# Patient Record
Sex: FEMALE | HISPANIC OR LATINO
[De-identification: names, ages, dates, MRNs, and addresses within clinical notes are randomized per-mention and may not be internally consistent; named-entity substitution may affect disease eponyms.]

---
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
## 2021-01-26 ENCOUNTER — EMERGENCY (EMERGENCY)
Facility: HOSPITAL | Age: 21
LOS: 0 days | Discharge: HOME | End: 2021-01-27
Attending: EMERGENCY MEDICINE
Payer: COMMERCIAL

## 2021-01-26 VITALS
SYSTOLIC BLOOD PRESSURE: 114 MMHG | OXYGEN SATURATION: 98 % | TEMPERATURE: 98 F | HEIGHT: 61 IN | RESPIRATION RATE: 18 BRPM | WEIGHT: 121.92 LBS | DIASTOLIC BLOOD PRESSURE: 65 MMHG | HEART RATE: 89 BPM

## 2021-01-26 DIAGNOSIS — G89.29 OTHER CHRONIC PAIN: ICD-10-CM

## 2021-01-26 DIAGNOSIS — M54.5 LOW BACK PAIN: ICD-10-CM

## 2021-01-26 LAB
ALBUMIN SERPL ELPH-MCNC: 3.7 G/DL — SIGNIFICANT CHANGE UP (ref 3.3–5)
ALP SERPL-CCNC: 65 U/L — SIGNIFICANT CHANGE UP (ref 40–120)
ALT FLD-CCNC: 18 U/L — SIGNIFICANT CHANGE UP (ref 12–78)
ANION GAP SERPL CALC-SCNC: 9 MMOL/L — SIGNIFICANT CHANGE UP (ref 5–17)
AST SERPL-CCNC: 8 U/L — LOW (ref 15–37)
BASOPHILS # BLD AUTO: 0.05 K/UL — SIGNIFICANT CHANGE UP (ref 0–0.2)
BASOPHILS NFR BLD AUTO: 0.6 % — SIGNIFICANT CHANGE UP (ref 0–2)
BILIRUB SERPL-MCNC: 0.3 MG/DL — SIGNIFICANT CHANGE UP (ref 0.2–1.2)
BUN SERPL-MCNC: 11 MG/DL — SIGNIFICANT CHANGE UP (ref 7–23)
CALCIUM SERPL-MCNC: 9.1 MG/DL — SIGNIFICANT CHANGE UP (ref 8.5–10.1)
CHLORIDE SERPL-SCNC: 110 MMOL/L — HIGH (ref 96–108)
CO2 SERPL-SCNC: 22 MMOL/L — SIGNIFICANT CHANGE UP (ref 22–31)
CREAT SERPL-MCNC: 0.48 MG/DL — LOW (ref 0.5–1.3)
EOSINOPHIL # BLD AUTO: 0.32 K/UL — SIGNIFICANT CHANGE UP (ref 0–0.5)
EOSINOPHIL NFR BLD AUTO: 4 % — SIGNIFICANT CHANGE UP (ref 0–6)
GLUCOSE SERPL-MCNC: 92 MG/DL — SIGNIFICANT CHANGE UP (ref 70–99)
HCG SERPL-ACNC: <1 MIU/ML — SIGNIFICANT CHANGE UP
HCT VFR BLD CALC: 37.6 % — SIGNIFICANT CHANGE UP (ref 34.5–45)
HGB BLD-MCNC: 13.1 G/DL — SIGNIFICANT CHANGE UP (ref 11.5–15.5)
IMM GRANULOCYTES NFR BLD AUTO: 0.3 % — SIGNIFICANT CHANGE UP (ref 0–1.5)
LYMPHOCYTES # BLD AUTO: 2.86 K/UL — SIGNIFICANT CHANGE UP (ref 1–3.3)
LYMPHOCYTES # BLD AUTO: 35.8 % — SIGNIFICANT CHANGE UP (ref 13–44)
MCHC RBC-ENTMCNC: 31.7 PG — SIGNIFICANT CHANGE UP (ref 27–34)
MCHC RBC-ENTMCNC: 34.8 GM/DL — SIGNIFICANT CHANGE UP (ref 32–36)
MCV RBC AUTO: 91 FL — SIGNIFICANT CHANGE UP (ref 80–100)
MONOCYTES # BLD AUTO: 0.55 K/UL — SIGNIFICANT CHANGE UP (ref 0–0.9)
MONOCYTES NFR BLD AUTO: 6.9 % — SIGNIFICANT CHANGE UP (ref 2–14)
NEUTROPHILS # BLD AUTO: 4.2 K/UL — SIGNIFICANT CHANGE UP (ref 1.8–7.4)
NEUTROPHILS NFR BLD AUTO: 52.4 % — SIGNIFICANT CHANGE UP (ref 43–77)
PLATELET # BLD AUTO: 322 K/UL — SIGNIFICANT CHANGE UP (ref 150–400)
POTASSIUM SERPL-MCNC: 3.5 MMOL/L — SIGNIFICANT CHANGE UP (ref 3.5–5.3)
POTASSIUM SERPL-SCNC: 3.5 MMOL/L — SIGNIFICANT CHANGE UP (ref 3.5–5.3)
PROT SERPL-MCNC: 7 GM/DL — SIGNIFICANT CHANGE UP (ref 6–8.3)
RBC # BLD: 4.13 M/UL — SIGNIFICANT CHANGE UP (ref 3.8–5.2)
RBC # FLD: 12.3 % — SIGNIFICANT CHANGE UP (ref 10.3–14.5)
SODIUM SERPL-SCNC: 141 MMOL/L — SIGNIFICANT CHANGE UP (ref 135–145)
WBC # BLD: 8 K/UL — SIGNIFICANT CHANGE UP (ref 3.8–10.5)
WBC # FLD AUTO: 8 K/UL — SIGNIFICANT CHANGE UP (ref 3.8–10.5)

## 2021-01-26 PROCEDURE — 99284 EMERGENCY DEPT VISIT MOD MDM: CPT

## 2021-01-26 PROCEDURE — 84702 CHORIONIC GONADOTROPIN TEST: CPT

## 2021-01-26 PROCEDURE — 80053 COMPREHEN METABOLIC PANEL: CPT

## 2021-01-26 PROCEDURE — 99284 EMERGENCY DEPT VISIT MOD MDM: CPT | Mod: 25

## 2021-01-26 PROCEDURE — 72132 CT LUMBAR SPINE W/DYE: CPT | Mod: 26

## 2021-01-26 PROCEDURE — 99053 MED SERV 10PM-8AM 24 HR FAC: CPT

## 2021-01-26 PROCEDURE — 85025 COMPLETE CBC W/AUTO DIFF WBC: CPT

## 2021-01-26 PROCEDURE — 96361 HYDRATE IV INFUSION ADD-ON: CPT

## 2021-01-26 PROCEDURE — 72132 CT LUMBAR SPINE W/DYE: CPT

## 2021-01-26 PROCEDURE — 36415 COLL VENOUS BLD VENIPUNCTURE: CPT

## 2021-01-26 PROCEDURE — 96374 THER/PROPH/DIAG INJ IV PUSH: CPT | Mod: XU

## 2021-01-26 RX ORDER — LIDOCAINE 4 G/100G
1 CREAM TOPICAL ONCE
Refills: 0 | Status: COMPLETED | OUTPATIENT
Start: 2021-01-26 | End: 2021-01-26

## 2021-01-26 RX ORDER — SODIUM CHLORIDE 9 MG/ML
1000 INJECTION INTRAMUSCULAR; INTRAVENOUS; SUBCUTANEOUS ONCE
Refills: 0 | Status: COMPLETED | OUTPATIENT
Start: 2021-01-26 | End: 2021-01-26

## 2021-01-26 RX ORDER — KETOROLAC TROMETHAMINE 30 MG/ML
30 SYRINGE (ML) INJECTION ONCE
Refills: 0 | Status: DISCONTINUED | OUTPATIENT
Start: 2021-01-26 | End: 2021-01-26

## 2021-01-26 RX ADMIN — SODIUM CHLORIDE 1000 MILLILITER(S): 9 INJECTION INTRAMUSCULAR; INTRAVENOUS; SUBCUTANEOUS at 23:31

## 2021-01-26 RX ADMIN — LIDOCAINE 1 PATCH: 4 CREAM TOPICAL at 23:30

## 2021-01-26 RX ADMIN — Medication 30 MILLIGRAM(S): at 23:31

## 2021-01-26 NOTE — ED PROVIDER NOTE - CLINICAL SUMMARY MEDICAL DECISION MAKING FREE TEXT BOX
pt with chronic lower back pain worse after epidural.  will check CT, labs, IVF, meds and reeval r/o abscess

## 2021-01-26 NOTE — ED PROVIDER NOTE - NS ED MD EM SELECTION
Patient is a 44/F with PMHx significant for Sickle cell disease (last known crisis 2013), cerebral aneurysm, migraine d/o who presented to the ED with sudden onset of back and rib pain at approx 8pm on the day of admission, found to be in sickle cell crisis, admit to F for further management, pain relief. Started on IVF.  Pain treated with Percocet, Dilaudid PRN. Cardiomegaly noted on chest x-ray, TTE performed. Hx of AVN of the     left hip, continued on home medication Neurotin. Heme, Dr. Gibbs, consulted FROM ADMISSION H+P:     HPI:    Patient is a 44/F with PMHx significant for Sickle cell disease (last known crisis September 2019), cerebral aneurysm, migraine, and avascular necrosis who presented to the ED with sudden onset of back and rib pain at approximately 7pm on the day of admission. Patient had attempted to take percocet for pain relief at 8:30 without any improvement in symptoms. Pt has percocet prescribed for avascular necrosis and she normally takes is BID. Pt states this is how her sickle cell crisis always presents. States her current pain is 10/10. Admits to sickle cell crisis about twice a year that requires hospitalization. States her baseline hemoglobin is around 8 and she needs a transfusion about once a year for hemoglobin <7. She denies any associated chest pain, SOB, n/v/d, abdominal pain, changes in vision, weakness, sick contacts, recent travel. Admits to a little bit of a headache.        ED Vitals:     T 98.5 HR 89 /93 RR 18 SpO2 97% on RA    Labs: Leukocytosis WBC 16.33 with lymphocytic predominance, anemic Hb 7.5 (Baseline 8), reticulocyte % 15.0. CMP wnl. COVID 19 PCR pending    CXR: lungs clear, lateral haziness likely overlying subq fat, enlarged heart despite AP film, when compared to previous AP film 2013. ([facility] read)    EKG: prelim NSR with borderline LVH critera    In the ED Patient received 1L Ns bolus, dilaudid 0.5mg x 1, morphine 4mg x1, zofran 4mg x 1 (11 May 2020 00:40)            ---    HOSPITAL COURSE:         Patient is a 44/F with PMHx significant for Sickle cell disease (last known crisis 2013), cerebral aneurysm, migraine d/o who presented to the ED with sudden onset of back and rib pain at approx 8pm on the day of admission, found to be in sickle cell crisis, admit to F for further management, pain relief. Started on IVF.  Pain treated with Percocet, Dilaudid PRN. Cardiomegaly noted on chest x-ray, TTE performed to evaluate cardiomegaly further, which showed normal LV size and function, normal RV size and function, trace physiologic MR/TR. Additionally, she has a history of avascular necrosis of the  left hip, continued on home medication Neurotin. Heme, Dr. Currie, evaluated the patient, suggested trending reticulocyte count and LDH, and Hb electrophoresis performed to document sickle cell disease. Patient's pain was controlled, and she improved clinically throughout her hospital course, and was stable for discharge home without any home care needs.         ---    CONSULTANTS:     Dr Currie (Heme/Onc)    ---        Patient was seen and evaluated on the day of discharge, stable for discharge home        Vital Signs Last 24 Hrs    T(C): 37.1 (14 May 2020 05:27), Max: 37.9 (13 May 2020 20:51)    T(F): 98.7 (14 May 2020 05:27), Max: 100.3 (13 May 2020 20:51)    HR: 75 (14 May 2020 05:27) (75 - 87)    BP: 119/78 (14 May 2020 05:27) (109/71 - 134/74)    BP(mean): --    RR: 17 (14 May 2020 05:27) (17 - 17)    SpO2: 93% (14 May 2020 05:27) (93% - 97%)        PHYSICAL EXAM:    GENERAL: NAD, well-groomed, well-developed    HEAD:  Atraumatic, Normocephalic    EYES: EOMI, PERRLA, conjunctiva and sclera clear    ENMT: No tonsillar erythema, exudates, or enlargement;     Moist mucous membranes,  No lesions    NECK: Supple, No JVD,    NERVOUS SYSTEM:  Alert & Oriented X3, Good concentration;     Motor Strength 5/5 B/L upper and lower extremities; DTRs 2+ intact and symmetric    CHEST/LUNG:  percussion bilaterally; No rales, rhonchi, wheezing,     HEART: Regular rate and rhythm; No murmurs, no tachy     ABDOMEN: Soft, Nontender, Nondistended; Bowel sounds present    EXTREMITIES:  2+ Peripheral Pulses, No clubbing, cyanosis, or edema    SKIN: No rashes or lesions            TIME SPENT:    I, the attending physician, was physically present for the key portions of the evaluation and management (E/M) service provided. The total amount of time spent reviewing the hospital notes, laboratory values, imaging findings, assessing/counseling the patient, discussing with consultant physicians, social work, nursing staff was -- minutes FROM ADMISSION H+P:     HPI:    Patient is a 44/F with PMHx significant for Sickle cell disease (last known crisis September 2019), cerebral aneurysm, migraine, and avascular necrosis who presented to the ED with sudden onset of back and rib pain at approximately 7pm on the day of admission. Patient had attempted to take percocet for pain relief at 8:30 without any improvement in symptoms. Pt has percocet prescribed for avascular necrosis and she normally takes is BID. Pt states this is how her sickle cell crisis always presents. States her current pain is 10/10. Admits to sickle cell crisis about twice a year that requires hospitalization. States her baseline hemoglobin is around 8 and she needs a transfusion about once a year for hemoglobin <7. She denies any associated chest pain, SOB, n/v/d, abdominal pain, changes in vision, weakness, sick contacts, recent travel. Admits to a little bit of a headache.        ED Vitals:     T 98.5 HR 89 /93 RR 18 SpO2 97% on RA    Labs: Leukocytosis WBC 16.33 with lymphocytic predominance, anemic Hb 7.5 (Baseline 8), reticulocyte % 15.0. CMP wnl. COVID 19 PCR pending    CXR: lungs clear, lateral haziness likely overlying subq fat, enlarged heart despite AP film, when compared to previous AP film 2013. ([facility] read)    EKG: prelim NSR with borderline LVH critera    In the ED Patient received 1L Ns bolus, dilaudid 0.5mg x 1, morphine 4mg x1, zofran 4mg x 1 (11 May 2020 00:40)            ---    HOSPITAL COURSE:         Patient is a 44/F with PMHx significant for Sickle cell disease (last known crisis 2013), cerebral aneurysm, migraine d/o who presented to the ED with sudden onset of back and rib pain at approx 8pm on the day of admission, found to be in sickle cell crisis, admit to F for further management, pain relief. Started on IVF.  Pain treated with Percocet, Dilaudid PRN. Cardiomegaly noted on chest x-ray, TTE performed to evaluate cardiomegaly further, which showed normal LV size and function, normal RV size and function, trace physiologic MR/TR. Additionally, she has a history of avascular necrosis of the  left hip, continued on home medication Neurotin. Heme, Dr. Currie, evaluated the patient, suggested trending reticulocyte count and LDH, and Hb electrophoresis performed to document sickle cell disease. Patient's pain was controlled   recived iv dialudid , and she improved clinically throughout her hospital course    hb remain above 7 so no need  blood transfusion , and was stable for discharge home without any home care needs.     pt pmd dr dykes out pt notified dc plan will fu up closely out pt , pt will fu up with her own hem/onc and pain management out pt . medically stable day of dc     Vital Signs Last 24 Hrs    T(C): 36.7 (15 May 2020 05:56), Max: 37.4 (14 May 2020 13:41)    T(F): 98 (15 May 2020 05:56), Max: 99.4 (14 May 2020 21:48)    HR: 74 (15 May 2020 05:56) (74 - 87)    BP: 101/67 (15 May 2020 05:56) (101/64 - 114/76)    BP(mean): --    RR: 18 (15 May 2020 05:56) (17 - 18)    SpO2: 95% (15 May 2020 05:56) (95% - 97%)    CONSULTANTS:     Dr Currie (Heme/Onc)    ---        Patient was seen and evaluated on the day of discharge,     stable for discharge home        Vital Signs Last 24 Hrs    T(C): 37.1 (14 May 2020 05:27), Max: 37.9 (13 May 2020 20:51)    T(F): 98.7 (14 May 2020 05:27), Max: 100.3 (13 May 2020 20:51)    HR: 75 (14 May 2020 05:27) (75 - 87)    BP: 119/78 (14 May 2020 05:27) (109/71 - 134/74)    BP(mean): --    RR: 17 (14 May 2020 05:27) (17 - 17)    SpO2: 93% (14 May 2020 05:27) (93% - 97%)        PHYSICAL EXAM:    GENERAL: NAD, well-groomed, well-developed    HEAD:  Atraumatic, Normocephalic    EYES: EOMI, PERRLA, conjunctiva and sclera clear    ENMT: No tonsillar erythema, exudates, or enlargement;     Moist mucous membranes,  No lesions    NECK: Supple, No JVD,    NERVOUS SYSTEM:  Alert & Oriented X3, Good concentration;     Motor Strength 5/5 B/L upper and lower extremities; DTRs 2+ intact and symmetric    CHEST/LUNG:  percussion bilaterally; No rales, rhonchi, wheezing,     HEART: Regular rate and rhythm; No murmurs, no tachy     ABDOMEN: Soft, Nontender, Nondistended; Bowel sounds present    EXTREMITIES:  2+ Peripheral Pulses, No clubbing, cyanosis, or edema    SKIN: No rashes or lesions            TIME SPENT:    I, the attending physician, was physically present for the key portions of the evaluation and management (E/M) service provided. The total amount of time spent reviewing the hospital notes, laboratory values, imaging findings, assessing/counseling the patient, discussing with consultant physicians, social work, nursing staff was 55 minutes [id number] Comprehensive
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
## 2021-01-26 NOTE — ED PROVIDER NOTE - OBJECTIVE STATEMENT
19 y/o female in ED c/o persistent worsening lower back pain x 1 month.   pt states recent epidural that has made pain worse.   pt states taking motrin with minimal relief.   pt denies any fever, HA, cp, sob, n/v/d/abd pain.   states increase pain with ambulation.   tolerating PO.   no sick contacts or recent travel
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
## 2021-01-26 NOTE — ED PROVIDER NOTE - PATIENT PORTAL LINK FT
You can access the FollowMyHealth Patient Portal offered by [facility] by registering at the following website: [email or web address]. By joining "[employer]’s FollowMyHealth portal, you will also be able to view your health information using other applications (apps) compatible with our system.

## 2021-01-26 NOTE — ED ADULT NURSE NOTE - NSIMPLEMENTINTERV_GEN_ALL_ED
Implemented All Fall Risk Interventions:  [place] to call system. Call bell, personal items and telephone within reach. Instruct patient to call for assistance. Room bathroom lighting operational. Non-slip footwear when patient is off stretcher. Physically safe environment: no spills, clutter or unnecessary equipment. Stretcher in lowest position, wheels locked, appropriate side rails in place. Provide visual cue, wrist band, yellow gown, etc. Monitor gait and stability. Monitor for mental status changes and reorient to person, place, and time. Review medications for side effects contributing to fall risk. Reinforce activity limits and safety measures with patient and family.

## 2021-01-26 NOTE — ED PROVIDER NOTE - CARE PLAN
Principal Discharge DX:	Chronic low back pain, unspecified back pain laterality, unspecified whether sciatica present

## 2021-01-26 NOTE — ED PROVIDER NOTE - NSFOLLOWUPINSTRUCTIONS_ED_ALL_ED_FT
please follow up with your doctor in 2-3 days.   take medications as prescribed.   drink plenty of fluids.   may use ice or heating pads for comfort.   return to ED for any concerns.   continue with PT as directed.      consulte con peoples médico en 2-3 días. tome los medicamentos según lo prescrito. beber mucho líquido. puede usar hielo o almohadillas térmicas para peoples comodidad. regrese al servicio de urgencias por cualquier inquietud. continúe con PT bob se le indique.

## 2021-01-27 VITALS
OXYGEN SATURATION: 99 % | HEART RATE: 68 BPM | TEMPERATURE: 98 F | RESPIRATION RATE: 18 BRPM | SYSTOLIC BLOOD PRESSURE: 118 MMHG | DIASTOLIC BLOOD PRESSURE: 72 MMHG

## 2021-01-27 PROBLEM — Z78.9 OTHER SPECIFIED HEALTH STATUS: Chronic | Status: ACTIVE | Noted: 2020-12-02

## 2021-01-27 RX ORDER — ACETAMINOPHEN 500 MG
650 TABLET ORAL ONCE
Refills: 0 | Status: COMPLETED | OUTPATIENT
Start: 2021-01-27 | End: 2021-01-27

## 2021-01-27 RX ORDER — CYCLOBENZAPRINE HYDROCHLORIDE 10 MG/1
1 TABLET, FILM COATED ORAL
Qty: 15 | Refills: 0
Start: 2021-01-27 | End: 2021-01-31

## 2021-01-27 RX ORDER — IBUPROFEN 200 MG
1 TABLET ORAL
Qty: 40 | Refills: 0
Start: 2021-01-27 | End: 2021-02-05

## 2021-01-27 RX ORDER — ACETAMINOPHEN 500 MG
2 TABLET ORAL
Qty: 120 | Refills: 0
Start: 2021-01-27 | End: 2021-02-05

## 2021-01-27 RX ADMIN — SODIUM CHLORIDE 1000 MILLILITER(S): 9 INJECTION INTRAMUSCULAR; INTRAVENOUS; SUBCUTANEOUS at 00:31

## 2021-01-27 RX ADMIN — Medication 650 MILLIGRAM(S): at 00:13

## 2021-01-27 NOTE — ED ADULT NURSE REASSESSMENT NOTE - NS ED NURSE REASSESS COMMENT FT1
patient able to ambulate independently with a steady gait while maintaining safety. patient states that she has pain when she walks but is able to ambulate safely independently.

## 2022-11-04 ENCOUNTER — EMERGENCY (EMERGENCY)
Facility: HOSPITAL | Age: 22
LOS: 0 days | Discharge: HOME | End: 2022-11-04
Attending: EMERGENCY MEDICINE
Payer: COMMERCIAL

## 2022-11-04 VITALS
HEART RATE: 81 BPM | RESPIRATION RATE: 19 BRPM | OXYGEN SATURATION: 100 % | TEMPERATURE: 98 F | SYSTOLIC BLOOD PRESSURE: 112 MMHG | DIASTOLIC BLOOD PRESSURE: 68 MMHG

## 2022-11-04 VITALS — WEIGHT: 126.99 LBS | HEIGHT: 61 IN

## 2022-11-04 DIAGNOSIS — R09.81 NASAL CONGESTION: ICD-10-CM

## 2022-11-04 DIAGNOSIS — R60.0 LOCALIZED EDEMA: ICD-10-CM

## 2022-11-04 PROCEDURE — 99283 EMERGENCY DEPT VISIT LOW MDM: CPT

## 2022-11-04 PROCEDURE — 99282 EMERGENCY DEPT VISIT SF MDM: CPT

## 2022-11-04 NOTE — ED STATDOCS - ATTENDING APP SHARED VISIT CONTRIBUTION OF CARE
I, Joe Urena, performed the initial face to face bedside interview with this patient regarding history of present illness, review of symptoms and relevant past medical, social and family history.  I completed an independent physical examination.  I was the initial provider who evaluated this patient. I have signed out the follow up of any pending tests (i.e. labs, radiological studies) to the JENNIFER.  I have communicated the patient’s plan of care and disposition with the JENNIFER.  The history, relevant review of systems, past medical and surgical history, medical decision making, and physical examination was documented by the scribe in my presence and I attest to the accuracy of the documentation.     pt with nasal congestion chronic.   visible mild edema of nasal turbinates.   lungs CTA.   will d/c with scripts and have f/u

## 2022-11-04 NOTE — ED ADULT TRIAGE NOTE - CHIEF COMPLAINT QUOTE
patient presenting ambulatory to ED c/o nasal congestion x2 months. patient states she was taking afrin since start of symptoms and stopped a few days ago, c/o worsening nasal congestion.

## 2022-11-04 NOTE — ED STATDOCS - NSFOLLOWUPINSTRUCTIONS_ED_ALL_ED_FT
Follow up with your primary care doctor and ENT doctor for further evaluation.    Return to the ER for any new or other concerns.        Nonallergic Rhinitis      Nonallergic rhinitis is inflammation of the mucous membrane inside the nose. The mucous membrane is the tissue that produces mucus. This condition is different from having allergic rhinitis, which is an allergy that affects the nose. Allergic rhinitis occurs when the body's defense system, or immune system, reacts to a substance that a person is allergic to (allergen), such as pollen, pet dander, mold, or dust. Nonallergic rhinitis has many similar symptoms, but it is not caused by allergens.    Nonallergic rhinitis can be an acute or chronic problem. This means it can be short-term or long-term.      What are the causes?    This condition may be caused by many different things. Some common types of nonallergic rhinitis include:  •Infectious rhinitis. This is usually caused by an infection in the nose, throat, or upper airways (upper respiratory system).      •Vasomotor rhinitis. This is the most common type of chronic nonallergic rhinitis. It is caused by too much blood flow through your nose, and it leads to swelling in your nose. It is triggered by strong odors, cold air, stress, drinking alcohol, cigarette smoke, or changes in the weather.      •Occupational rhinitis. This type is caused by triggers in the workplace, such as chemicals, dust, animal dander, or air pollution.      •Hormonal rhinitis, in teenage girls and women. This type is caused by an increase in the hormone estrogen and may happen during pregnancy, puberty, or monthly menstrual periods. Hormonal rhinitis gives you fewer symptoms when estrogen levels drop.      •Drug-induced rhinitis. Several types of medicines can cause this, such as medicines for high blood pressure or heart disease, aspirin, or NSAIDs.      •Nonallergic rhinitis with eosinophilia syndrome (NARES). This type is caused by having too much eosinophil, a type of white blood cell.      Other causes include a reaction to eating hot or spicy foods. This does not usually cause long-term symptoms.    In some cases, the cause of nonallergic rhinitis is not known.      What increases the risk?    You are more likely to develop this condition if:  •You are 30–60 years of age.      •You are a woman. Women are twice as likely to have this condition.        What are the signs or symptoms?    Common symptoms of this condition include:  •Stuffy nose (nasal congestion).      •Runny nose.      •A feeling of mucus dripping down the back of your throat (postnasal drip).      •Trouble sleeping.      •Tiredness, or fatigue.      Other symptoms include:  •Sneezing.      •Coughing.      •Itchy nose.      •Bloodshot eyes.        How is this diagnosed?    This type may be diagnosed based on:  •Your symptoms and medical history.      •A physical exam.      •Allergy testing to rule out allergic rhinitis. You may have skin tests or blood tests.      Your health care provider may also take a swab of nasal discharge to look for an increased number of eosinophils. This would be done to confirm a diagnosis of NARES.      How is this treated?     Treatment for this condition depends on the cause. No single treatment works for everyone. Work with your health care provider to find the best treatment for you. Treatment may include:  •Avoiding the things that trigger your symptoms.    •Medicines to relieve congestion, such as:  •Steroid nasal spray. There are many types. You may need to try a few to find out which one works best.      •Decongestant medicine. This treats nasal congestion and may be given by mouth or as a nasal spray. These medicines are used only for a short time.        •Medicines to relieve a runny nose. These may include antihistamine medicines or anticholinergic nasal sprays.      •Nasal irrigation. This involves using a salt–water (saline) spray or saline container called a neti pot. Nasal irrigation helps to clear away mucus and keep your nasal passages moist.      •Surgery to remove part of your mucous membrane. This is done in severe cases if the condition has not improved after 6–12 months of treatment.        Follow these instructions at home:    Medicines     •Take or use over-the-counter and prescription medicines only as told by your health care provider. Do not stop using your medicine even if you start to feel better.      • Do not take NSAIDs, such as ibuprofen, or medicines that contain aspirin if they make your symptoms worse.      Lifestyle     • Do not drink alcohol if it makes your symptoms worse.      • Do not use any products that contain nicotine or tobacco, such as cigarettes, e-cigarettes, and chewing tobacco. If you need help quitting, ask your health care provider.      •Avoid secondhand smoke.      General instructions     •Avoid triggers that make your symptoms worse.      •Use nasal irrigation as told by your health care provider.      •Get exercise. Exercise may help reduce symptoms for some people.      •Sleep with the head of your bed raised. This may reduce nasal congestion when you sleep.      •Drink enough fluid to keep your urine pale yellow.      •Keep all follow-up visits as told by your health care provider. This is important.        Contact a health care provider if:    •You have a fever.      •Your symptoms are getting worse at home.      •Your symptoms do not lessen with medicine.      •You develop new symptoms, especially a headache or nosebleed.        Summary    •Nonallergic rhinitis is inflammation inside the nose that is not caused by allergens. Nonallergic rhinitis can be a short-term or long-term problem.      •Treatment may include avoiding the things that trigger your symptoms.      •Take or use over-the-counter and prescription medicines only as told by your health care provider. Do not stop using your medicine even if you start to feel better.      •Contact a health care provider if your symptoms do not lessen with medicine.

## 2022-11-04 NOTE — ED STATDOCS - PROGRESS NOTE DETAILS
22 yo female presents with nasal congestion x 3 months. Pt states she saw her PMD and was placed on afrin, flonase, and loratadine which has been feeling better at first and getting worse. Pt was supposed to f/u with her pmd on 10/25 but had her appointment canceled and had it placed towards the end of the month. Advised pt to stop the afrin, and continue the flonase and loratadine. Will give NET referral to have further evaluation of her symptoms. Pt aware and agrees with plan. -Julio Pacheco PA-C

## 2022-11-04 NOTE — ED STATDOCS - PATIENT PORTAL LINK FT
You can access the FollowMyHealth Patient Portal offered by [facility] by registering at the following website: [email or web address]. By joining [employer]’s FollowMyHealth portal, you will also be able to view your health information using other applications (apps) compatible with our system.

## 2022-11-04 NOTE — ED STATDOCS - ENMT, MLM
+ mild edematous nasal ter bident. Nasal mucosa clear.  Mouth with normal mucosa  Throat has no vesicles, no oropharyngeal exudates and uvula is midline. + mild edematous nasal turbinates. Nasal mucosa clear.  Mouth with normal mucosa  Throat has no vesicles, no oropharyngeal exudates and uvula is midline.

## 2022-11-04 NOTE — ED STATDOCS - OBJECTIVE STATEMENT
22 y/o female no PMHx presents to ED with nasal congestion for 3 months. Pt has been taking Asthma spray and multiple other sprays with no relief. Has not followed up with ENT. Pt denies any other symptoms. 22 y/o female no PMHx presents to ED with nasal congestion for 3 months. Pt has been taking Afrin spray and multiple other sprays with no relief. Has not followed up with ENT. Pt denies any other symptoms.

## 2023-05-02 NOTE — ED STATDOCS - CROS ED GI ALL NEG
Quality 130: Documentation Of Current Medications In The Medical Record: Current Medications Documented Quality 431: Preventive Care And Screening: Unhealthy Alcohol Use - Screening: Patient not identified as an unhealthy alcohol user when screened for unhealthy alcohol use using a systematic screening method Detail Level: Detailed Quality 226: Preventive Care And Screening: Tobacco Use: Screening And Cessation Intervention: Patient screened for tobacco use and is an ex/non-smoker negative...

## 2023-08-08 ENCOUNTER — NON-APPOINTMENT (OUTPATIENT)
Age: 23
End: 2023-08-08

## 2023-08-08 ENCOUNTER — APPOINTMENT (OUTPATIENT)
Dept: DERMATOLOGY | Facility: CLINIC | Age: 23
End: 2023-08-08
Payer: COMMERCIAL

## 2023-08-08 DIAGNOSIS — L82.1 OTHER SEBORRHEIC KERATOSIS: ICD-10-CM

## 2023-08-08 PROCEDURE — 17111 DESTRUCTION B9 LESIONS 15/>: CPT

## 2023-08-08 PROCEDURE — 99202 OFFICE O/P NEW SF 15 MIN: CPT | Mod: 25

## 2023-08-08 NOTE — ASSESSMENT
[FreeTextEntry1] : #Verruca vulgaris x27 Mostly around R knee but also BLE - The patient was informed of the pathophysiology of their lesions and their treatment course with liquid nitrogen (cryosurgery). Side effects include blister formation, hypopigmentation, and scarring. Patient was verbally consented and the lesions identified above were treated with liquid nitrogen freeze, thaw, freeze x 10 seconds each cycle x 2. The patient tolerated the procedure well.  Wound care instructions, care of a blister with vaseline, signs and symptoms of infection were discussed in full.  The patient denies any questions at this time. - Candida antigen 0.1cc injected SQ into R knee. Patient tolerated it well. Side effects discussed  #Dermatosis papulosa nigra, face and L neck - Benign diagnosis - Patient reassured - Nothing further to do; OTC sarna PRN itch  RTC 6weeks

## 2023-08-08 NOTE — HISTORY OF PRESENT ILLNESS
[FreeTextEntry1] : warts [de-identified] : Referred by: Dr. CHARISSE DOMINGUEZ,ANTHONY COLUNGA  Ms. FELISA SHARPE is a 22 year old F here for evaluation of below  Problem: warts Location: started on R knee, spreading on BLE Duration: years Associated symptoms/Aggravating Factors: spreading Modifying factors/Treatments: none  No other changing or concerning lesions.  No itchy, growing, bleeding, painful, or changing moles.   Personal hx of skin cancer: no FHx of skin cancer: no Social Hx: works at [employer]

## 2023-08-08 NOTE — PHYSICAL EXAM
[Alert] : alert [Oriented x 3] : ~L oriented x 3 [Well Nourished] : well nourished [Conjunctiva Non-injected] : conjunctiva non-injected [No Visual Lymphadenopathy] : no visual  lymphadenopathy [No Clubbing] : no clubbing [No Edema] : no edema [No Bromhidrosis] : no bromhidrosis [No Chromhidrosis] : no chromhidrosis [Declined] : declined [FreeTextEntry3] : Focused exam only (see below) per patient request:  numerous verrucous hyperpigmented papules on R knee >> popliteal fossa, calves brown stuckon waxy brown papules face and L neck

## 2023-09-19 ENCOUNTER — APPOINTMENT (OUTPATIENT)
Dept: DERMATOLOGY | Facility: CLINIC | Age: 23
End: 2023-09-19
Payer: COMMERCIAL

## 2023-09-19 DIAGNOSIS — A63.0 ANOGENITAL (VENEREAL) WARTS: ICD-10-CM

## 2023-09-19 PROCEDURE — 99213 OFFICE O/P EST LOW 20 MIN: CPT | Mod: 25

## 2023-09-19 PROCEDURE — 17111 DESTRUCTION B9 LESIONS 15/>: CPT

## 2023-09-19 RX ORDER — IMIQUIMOD 50 MG/G
5 CREAM TOPICAL
Qty: 1 | Refills: 1 | Status: ACTIVE | COMMUNITY
Start: 2023-09-19 | End: 1900-01-01

## 2023-11-02 ENCOUNTER — APPOINTMENT (OUTPATIENT)
Dept: DERMATOLOGY | Facility: CLINIC | Age: 23
End: 2023-11-02
Payer: COMMERCIAL

## 2023-11-02 DIAGNOSIS — B07.9 VIRAL WART, UNSPECIFIED: ICD-10-CM

## 2023-11-02 PROCEDURE — 99213 OFFICE O/P EST LOW 20 MIN: CPT

## 2024-01-23 ENCOUNTER — RESULT REVIEW (OUTPATIENT)
Age: 24
End: 2024-01-23